# Patient Record
Sex: MALE | Race: WHITE | NOT HISPANIC OR LATINO
[De-identification: names, ages, dates, MRNs, and addresses within clinical notes are randomized per-mention and may not be internally consistent; named-entity substitution may affect disease eponyms.]

---

## 2017-11-09 PROBLEM — Z00.00 ENCOUNTER FOR PREVENTIVE HEALTH EXAMINATION: Status: ACTIVE | Noted: 2017-11-09

## 2017-11-15 ENCOUNTER — APPOINTMENT (OUTPATIENT)
Dept: HEMATOLOGY ONCOLOGY | Facility: CLINIC | Age: 57
End: 2017-11-15
Payer: COMMERCIAL

## 2017-11-15 VITALS
HEIGHT: 72 IN | BODY MASS INDEX: 35.89 KG/M2 | SYSTOLIC BLOOD PRESSURE: 128 MMHG | HEART RATE: 55 BPM | TEMPERATURE: 98.4 F | OXYGEN SATURATION: 100 % | WEIGHT: 265 LBS | DIASTOLIC BLOOD PRESSURE: 65 MMHG | RESPIRATION RATE: 16 BRPM

## 2017-11-15 PROCEDURE — 99245 OFF/OP CONSLTJ NEW/EST HI 55: CPT

## 2017-11-15 RX ORDER — MULTIVITAMIN
CAPSULE ORAL
Refills: 0 | Status: ACTIVE | COMMUNITY

## 2017-11-27 ENCOUNTER — APPOINTMENT (OUTPATIENT)
Dept: HEMATOLOGY ONCOLOGY | Facility: CLINIC | Age: 57
End: 2017-11-27
Payer: COMMERCIAL

## 2017-11-27 VITALS
BODY MASS INDEX: 36.57 KG/M2 | TEMPERATURE: 98.5 F | OXYGEN SATURATION: 96 % | HEART RATE: 60 BPM | HEIGHT: 71.97 IN | SYSTOLIC BLOOD PRESSURE: 136 MMHG | RESPIRATION RATE: 16 BRPM | WEIGHT: 270 LBS | DIASTOLIC BLOOD PRESSURE: 71 MMHG

## 2017-11-27 PROCEDURE — 99214 OFFICE O/P EST MOD 30 MIN: CPT

## 2018-02-09 ENCOUNTER — APPOINTMENT (OUTPATIENT)
Dept: HEMATOLOGY ONCOLOGY | Facility: CLINIC | Age: 58
End: 2018-02-09
Payer: COMMERCIAL

## 2018-02-09 VITALS
BODY MASS INDEX: 36.29 KG/M2 | DIASTOLIC BLOOD PRESSURE: 65 MMHG | RESPIRATION RATE: 20 BRPM | HEART RATE: 63 BPM | SYSTOLIC BLOOD PRESSURE: 122 MMHG | TEMPERATURE: 98.5 F | HEIGHT: 71.85 IN | OXYGEN SATURATION: 96 % | WEIGHT: 265 LBS

## 2018-02-09 PROCEDURE — 99214 OFFICE O/P EST MOD 30 MIN: CPT

## 2018-05-16 ENCOUNTER — APPOINTMENT (OUTPATIENT)
Dept: HEMATOLOGY ONCOLOGY | Facility: CLINIC | Age: 58
End: 2018-05-16

## 2018-12-19 ENCOUNTER — RECORD ABSTRACTING (OUTPATIENT)
Age: 58
End: 2018-12-19

## 2018-12-19 DIAGNOSIS — Z82.49 FAMILY HISTORY OF ISCHEMIC HEART DISEASE AND OTHER DISEASES OF THE CIRCULATORY SYSTEM: ICD-10-CM

## 2018-12-19 DIAGNOSIS — Z78.9 OTHER SPECIFIED HEALTH STATUS: ICD-10-CM

## 2019-01-18 ENCOUNTER — NON-APPOINTMENT (OUTPATIENT)
Age: 59
End: 2019-01-18

## 2019-01-18 ENCOUNTER — LABORATORY RESULT (OUTPATIENT)
Age: 59
End: 2019-01-18

## 2019-01-18 ENCOUNTER — APPOINTMENT (OUTPATIENT)
Dept: INTERNAL MEDICINE | Facility: CLINIC | Age: 59
End: 2019-01-18
Payer: COMMERCIAL

## 2019-01-18 VITALS
DIASTOLIC BLOOD PRESSURE: 82 MMHG | SYSTOLIC BLOOD PRESSURE: 128 MMHG | WEIGHT: 254 LBS | HEIGHT: 71 IN | BODY MASS INDEX: 35.56 KG/M2

## 2019-01-18 PROCEDURE — 93000 ELECTROCARDIOGRAM COMPLETE: CPT

## 2019-01-18 PROCEDURE — 99396 PREV VISIT EST AGE 40-64: CPT | Mod: 25

## 2019-01-18 PROCEDURE — 36415 COLL VENOUS BLD VENIPUNCTURE: CPT

## 2019-01-18 RX ORDER — IBUPROFEN 600 MG/1
600 TABLET, FILM COATED ORAL
Refills: 0 | Status: DISCONTINUED | COMMUNITY
End: 2019-01-18

## 2019-01-18 NOTE — HEALTH RISK ASSESSMENT
[Good] : ~his/her~  mood as  good [] : Yes [No falls in past year] : Patient reported no falls in the past year [0] : 2) Feeling down, depressed, or hopeless: Not at all (0) [HIV test declined] : HIV test declined [Hepatitis C test offered] : Hepatitis C test offered [None] : None [With Significant Other] : lives with significant other [] :  [Feels Safe at Home] : Feels safe at home [Fully functional (bathing, dressing, toileting, transferring, walking, feeding)] : Fully functional (bathing, dressing, toileting, transferring, walking, feeding) [Fully functional (using the telephone, shopping, preparing meals, housekeeping, doing laundry, using] : Fully functional and needs no help or supervision to perform IADLs (using the telephone, shopping, preparing meals, housekeeping, doing laundry, using transportation, managing medications and managing finances) [FreeTextEntry1] : upper right arm pain [de-identified] : 1 to 2 a day [de-identified] : social drinking [Reports changes in hearing] : Reports no changes in hearing [Reports changes in vision] : Reports no changes in vision [Reports changes in dental health] : Reports no changes in dental health [ColonoscopyComments] : scheduled at [facility], Feb [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

## 2019-01-18 NOTE — HISTORY OF PRESENT ILLNESS
[FreeTextEntry1] : c/o pain in upper right arm [de-identified] : Pt here for annual physical. Saw Dr Ha Sage for neck pain. He had an MRI neck and is going to do PT for 1 month. Then may be eval for injections if not improved. \par Otherwise feels well. No chest pains or SOB\par

## 2019-01-18 NOTE — REVIEW OF SYSTEMS
[Negative] : Heme/Lymph [Joint Pain] : joint pain [Joint Stiffness] : joint stiffness [Muscle Pain] : muscle pain [Joint Swelling] : no joint swelling [FreeTextEntry9] : + neck pain and stiffness

## 2019-01-19 LAB
ALBUMIN SERPL ELPH-MCNC: 4.8 G/DL
ALP BLD-CCNC: 37 U/L
ALT SERPL-CCNC: 31 U/L
ANION GAP SERPL CALC-SCNC: 11 MMOL/L
AST SERPL-CCNC: 22 U/L
BILIRUB SERPL-MCNC: 0.6 MG/DL
BUN SERPL-MCNC: 14 MG/DL
CALCIUM SERPL-MCNC: 9.3 MG/DL
CHLORIDE SERPL-SCNC: 104 MMOL/L
CHOLEST SERPL-MCNC: 199 MG/DL
CHOLEST/HDLC SERPL: 4.6 RATIO
CO2 SERPL-SCNC: 27 MMOL/L
CREAT SERPL-MCNC: 0.76 MG/DL
GLUCOSE SERPL-MCNC: 100 MG/DL
HBA1C MFR BLD HPLC: 5.2 %
HCV AB SER QL: NONREACTIVE
HCV S/CO RATIO: 0.13 S/CO
HDLC SERPL-MCNC: 43 MG/DL
LDLC SERPL CALC-MCNC: 122 MG/DL
POTASSIUM SERPL-SCNC: 4.9 MMOL/L
PROT SERPL-MCNC: 7.1 G/DL
PSA SERPL-MCNC: 2.84 NG/ML
SODIUM SERPL-SCNC: 142 MMOL/L
TRIGL SERPL-MCNC: 170 MG/DL
TSH SERPL-ACNC: 1.74 UIU/ML

## 2019-02-01 ENCOUNTER — TRANSCRIPTION ENCOUNTER (OUTPATIENT)
Age: 59
End: 2019-02-01

## 2022-08-01 ENCOUNTER — APPOINTMENT (OUTPATIENT)
Dept: FAMILY MEDICINE | Facility: CLINIC | Age: 62
End: 2022-08-01

## 2022-08-01 PROCEDURE — 99213 OFFICE O/P EST LOW 20 MIN: CPT | Mod: 95

## 2022-08-02 NOTE — HISTORY OF PRESENT ILLNESS
[FreeTextEntry8] : Pt is 61 y/o M that tested positive for Covid yesterday. Started sxs 2 days ago. Fever, cough and runny nose. \par Mucinex max helped. \par Vaccinated. \par needs a letter faxed to work. fax [phone]. attn: Mekhi.

## 2023-06-05 ENCOUNTER — TRANSCRIPTION ENCOUNTER (OUTPATIENT)
Age: 63
End: 2023-06-05

## 2023-06-29 ENCOUNTER — APPOINTMENT (OUTPATIENT)
Dept: INTERNAL MEDICINE | Facility: CLINIC | Age: 63
End: 2023-06-29
Payer: COMMERCIAL

## 2023-06-29 ENCOUNTER — NON-APPOINTMENT (OUTPATIENT)
Age: 63
End: 2023-06-29

## 2023-06-29 VITALS
BODY MASS INDEX: 34.3 KG/M2 | DIASTOLIC BLOOD PRESSURE: 80 MMHG | SYSTOLIC BLOOD PRESSURE: 140 MMHG | HEART RATE: 50 BPM | OXYGEN SATURATION: 98 % | HEIGHT: 71 IN | WEIGHT: 245 LBS

## 2023-06-29 VITALS — SYSTOLIC BLOOD PRESSURE: 120 MMHG | DIASTOLIC BLOOD PRESSURE: 72 MMHG

## 2023-06-29 DIAGNOSIS — F17.200 NICOTINE DEPENDENCE, UNSPECIFIED, UNCOMPLICATED: ICD-10-CM

## 2023-06-29 DIAGNOSIS — Z87.39 PERSONAL HISTORY OF OTHER DISEASES OF THE MUSCULOSKELETAL SYSTEM AND CONNECTIVE TISSUE: ICD-10-CM

## 2023-06-29 DIAGNOSIS — D72.819 DECREASED WHITE BLOOD CELL COUNT, UNSPECIFIED: ICD-10-CM

## 2023-06-29 DIAGNOSIS — D61.818 OTHER PANCYTOPENIA: ICD-10-CM

## 2023-06-29 DIAGNOSIS — U07.1 COVID-19: ICD-10-CM

## 2023-06-29 DIAGNOSIS — R00.1 BRADYCARDIA, UNSPECIFIED: ICD-10-CM

## 2023-06-29 DIAGNOSIS — Z12.5 ENCOUNTER FOR SCREENING FOR MALIGNANT NEOPLASM OF PROSTATE: ICD-10-CM

## 2023-06-29 DIAGNOSIS — K76.0 FATTY (CHANGE OF) LIVER, NOT ELSEWHERE CLASSIFIED: ICD-10-CM

## 2023-06-29 DIAGNOSIS — Z00.00 ENCOUNTER FOR GENERAL ADULT MEDICAL EXAMINATION W/OUT ABNORMAL FINDINGS: ICD-10-CM

## 2023-06-29 DIAGNOSIS — L98.9 DISORDER OF THE SKIN AND SUBCUTANEOUS TISSUE, UNSPECIFIED: ICD-10-CM

## 2023-06-29 DIAGNOSIS — Z86.2 PERSONAL HISTORY OF DISEASES OF THE BLOOD AND BLOOD-FORMING ORGANS AND CERTAIN DISORDERS INVOLVING THE IMMUNE MECHANISM: ICD-10-CM

## 2023-06-29 PROCEDURE — 99396 PREV VISIT EST AGE 40-64: CPT | Mod: 25

## 2023-06-29 PROCEDURE — 93000 ELECTROCARDIOGRAM COMPLETE: CPT

## 2023-06-29 PROCEDURE — 82270 OCCULT BLOOD FECES: CPT

## 2023-06-29 PROCEDURE — 36415 COLL VENOUS BLD VENIPUNCTURE: CPT

## 2023-06-29 RX ORDER — NIRMATRELVIR AND RITONAVIR 300-100 MG
20 X 150 MG & KIT ORAL
Qty: 1 | Refills: 0 | Status: DISCONTINUED | COMMUNITY
Start: 2022-08-01 | End: 2023-06-29

## 2023-06-29 NOTE — PHYSICAL EXAM
[Normal] : no acute distress, well nourished, well developed and well-appearing [No Acute Distress] : no acute distress [Well Nourished] : well nourished [Well Developed] : well developed [Well-Appearing] : well-appearing [Normal Sclera/Conjunctiva] : normal sclera/conjunctiva [EOMI] : extraocular movements intact [Normal Outer Ear/Nose] : the outer ears and nose were normal in appearance [Normal Oropharynx] : the oropharynx was normal [Normal TMs] : both tympanic membranes were normal [No JVD] : no jugular venous distention [No Lymphadenopathy] : no lymphadenopathy [Supple] : supple [Thyroid Normal, No Nodules] : the thyroid was normal and there were no nodules present [No Respiratory Distress] : no respiratory distress  [No Accessory Muscle Use] : no accessory muscle use [Clear to Auscultation] : lungs were clear to auscultation bilaterally [Normal Rate] : normal rate  [Regular Rhythm] : with a regular rhythm [Normal S1, S2] : normal S1 and S2 [No Murmur] : no murmur heard [No Carotid Bruits] : no carotid bruits [Pedal Pulses Present] : the pedal pulses are present [No Edema] : there was no peripheral edema [Soft] : abdomen soft [Non Tender] : non-tender [Non-distended] : non-distended [No Masses] : no abdominal mass palpated [No HSM] : no HSM [Normal Bowel Sounds] : normal bowel sounds [Normal Sphincter Tone] : normal sphincter tone [No Mass] : no mass [Normal Posterior Cervical Nodes] : no posterior cervical lymphadenopathy [Normal Anterior Cervical Nodes] : no anterior cervical lymphadenopathy [No CVA Tenderness] : no CVA  tenderness [No Spinal Tenderness] : no spinal tenderness [No Joint Swelling] : no joint swelling [Grossly Normal Strength/Tone] : grossly normal strength/tone [No Rash] : no rash [Coordination Grossly Intact] : coordination grossly intact [No Focal Deficits] : no focal deficits [Normal Gait] : normal gait [Deep Tendon Reflexes (DTR)] : deep tendon reflexes were 2+ and symmetric [Normal Affect] : the affect was normal [Normal Insight/Judgement] : insight and judgment were intact [Stool Occult Blood] : stool negative for occult blood [FreeTextEntry1] : stool for OB neg

## 2023-06-29 NOTE — HEALTH RISK ASSESSMENT
[Good] : ~his/her~  mood as  good [Yes] : Yes [2 - 4 times a month (2 pts)] : 2-4 times a month (2 points) [1 or 2 (0 pts)] : 1 or 2 (0 points) [Never (0 pts)] : Never (0 points) [No] : In the past 12 months have you used drugs other than those required for medical reasons? No [No falls in past year] : Patient reported no falls in the past year [0] : 2) Feeling down, depressed, or hopeless: Not at all (0) [Hepatitis C test offered] : Hepatitis C test offered [Current] : Current [Patient reported colonoscopy was normal] : Patient reported colonoscopy was normal [HIV test declined] : HIV test declined [None] : None [With Significant Other] : lives with significant other [Employed] : employed [] :  [Feels Safe at Home] : Feels safe at home [Fully functional (bathing, dressing, toileting, transferring, walking, feeding)] : Fully functional (bathing, dressing, toileting, transferring, walking, feeding) [Fully functional (using the telephone, shopping, preparing meals, housekeeping, doing laundry, using] : Fully functional and needs no help or supervision to perform IADLs (using the telephone, shopping, preparing meals, housekeeping, doing laundry, using transportation, managing medications and managing finances) [0-4] : 0-4 [FreeTextEntry1] : none [de-identified] : walking,hiking  [de-identified] : regular diet  [[id number]] : 0 [Reports changes in hearing] : Reports no changes in hearing [Reports changes in vision] : Reports no changes in vision [Reports changes in dental health] : Reports no changes in dental health [ColonoscopyDate] : 01/19 [ColonoscopyComments] : Dr Scanlon in [facility], due 9/1/23 [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

## 2023-06-29 NOTE — HISTORY OF PRESENT ILLNESS
[FreeTextEntry1] : Physical  [de-identified] : Pt here for annual physical.  Has not had a physical since 2019.  Lost 9 lb since jan 2019. He exercises a couple times per week. No cardiac complaints. \par Pt still smoking about 6x per week.  He understands the neg risks\par

## 2023-06-30 ENCOUNTER — TRANSCRIPTION ENCOUNTER (OUTPATIENT)
Age: 63
End: 2023-06-30

## 2023-06-30 LAB
ALBUMIN SERPL ELPH-MCNC: 5.1 G/DL
ALP BLD-CCNC: 41 U/L
ALT SERPL-CCNC: 26 U/L
ANION GAP SERPL CALC-SCNC: 12 MMOL/L
APPEARANCE: CLEAR
AST SERPL-CCNC: 22 U/L
BILIRUB SERPL-MCNC: 0.6 MG/DL
BILIRUBIN URINE: NEGATIVE
BLOOD URINE: NEGATIVE
BUN SERPL-MCNC: 10 MG/DL
CALCIUM SERPL-MCNC: 9.7 MG/DL
CHLORIDE SERPL-SCNC: 104 MMOL/L
CHOLEST SERPL-MCNC: 208 MG/DL
CO2 SERPL-SCNC: 28 MMOL/L
COLOR: YELLOW
CREAT SERPL-MCNC: 0.82 MG/DL
EGFR: 100 ML/MIN/1.73M2
ESTIMATED AVERAGE GLUCOSE: 100 MG/DL
GLUCOSE QUALITATIVE U: NEGATIVE MG/DL
GLUCOSE SERPL-MCNC: 94 MG/DL
HBA1C MFR BLD HPLC: 5.1 %
HCT VFR BLD CALC: 45.2 %
HCV AB SER QL: NONREACTIVE
HCV S/CO RATIO: 0.1 S/CO
HDLC SERPL-MCNC: 50 MG/DL
HGB BLD-MCNC: 14.3 G/DL
HIV1+2 AB SPEC QL IA.RAPID: NONREACTIVE
KETONES URINE: NEGATIVE MG/DL
LDLC SERPL CALC-MCNC: 134 MG/DL
LEUKOCYTE ESTERASE URINE: NEGATIVE
MCHC RBC-ENTMCNC: 28 PG
MCHC RBC-ENTMCNC: 31.6 GM/DL
MCV RBC AUTO: 88.5 FL
NITRITE URINE: NEGATIVE
NONHDLC SERPL-MCNC: 158 MG/DL
PH URINE: 6.5
PLATELET # BLD AUTO: 132 K/UL
POTASSIUM SERPL-SCNC: 4.7 MMOL/L
PROT SERPL-MCNC: 7.3 G/DL
PROTEIN URINE: NEGATIVE MG/DL
PSA SERPL-MCNC: 3.23 NG/ML
RBC # BLD: 5.11 M/UL
RBC # FLD: 13.7 %
SODIUM SERPL-SCNC: 144 MMOL/L
SPECIFIC GRAVITY URINE: 1.01
TRIGL SERPL-MCNC: 121 MG/DL
TSH SERPL-ACNC: 2 UIU/ML
UROBILINOGEN URINE: 0.2 MG/DL
WBC # FLD AUTO: 4.66 K/UL

## 2023-07-01 ENCOUNTER — TRANSCRIPTION ENCOUNTER (OUTPATIENT)
Age: 63
End: 2023-07-01

## 2023-07-01 LAB
VZV AB TITR SER: POSITIVE
VZV IGG SER IF-ACNC: 844.2 INDEX

## 2023-07-03 ENCOUNTER — TRANSCRIPTION ENCOUNTER (OUTPATIENT)
Age: 63
End: 2023-07-03

## 2023-07-10 ENCOUNTER — APPOINTMENT (OUTPATIENT)
Dept: THORACIC SURGERY | Facility: CLINIC | Age: 63
End: 2023-07-10
Payer: COMMERCIAL

## 2023-07-10 VITALS — BODY MASS INDEX: 34.3 KG/M2 | HEIGHT: 71 IN | WEIGHT: 245 LBS

## 2023-07-10 DIAGNOSIS — F17.210 NICOTINE DEPENDENCE, CIGARETTES, UNCOMPLICATED: ICD-10-CM

## 2023-07-10 PROCEDURE — G0296 VISIT TO DETERM LDCT ELIG: CPT

## 2023-07-10 NOTE — ASSESSMENT
[Ready] : Patient is ready for cessation intervention [Contemplation] : Contemplation: The patient is considering quitting smoking [de-identified] : Acknowledged how  difficult it is to quit smoking. Advised that quitting smoking is the most important thing a person can do for their health.  Discussed use of gum or lozenge in an effort to quit. He will consider.

## 2023-07-10 NOTE — PLAN
[Smoking Cessation Guidance Provided] : Smoking cessation guidance was provided to patient [Smoking Cessation] : smoking cessation [FreeTextEntry1] : Plan:\par \par -Low dose CT chest for lung cancer screening. RETESHALA SHANIQUA ordered the low dose CT.      \par \par -Follow up with  his referring provider after his LDCT results have been reviewed by the multidisciplinary clinical team, if needed.\par \par -Encouraged smoking cessation.\par \par -Patient declines referral to CTC and CCX\par \par Should I screen? tool utilized. 6 Year risk of lung cancer is   0.2%. \par \par Patient wishes to proceed with screening.\par \par Engaged in discussion regarding risks of screening during Covid-19 pandemic and precautions that are being used  to reduce exposure.\par \par Engaged in shared decision making with Mr. VARNER . Answered all questions. He verbalized understanding and agreement. He knows to call back with and questions or concerns.\par

## 2023-07-10 NOTE — HISTORY OF PRESENT ILLNESS
[Current] : Current [TextBox_13] : Referred by Dr. Sepulveda\par \par STEFFI VARNER had telephonic visit for a review of eligibility and discussion of the Low dose CT lung cancer screening program. The following was reviewed and confirmed the patient meets screening eligibility criteria.\par -Age 63 year\par Smoking Status:\par -Current smoker\par Started smoking at 20   years old. Smoked 10 cigarettes per day for 26, then none and up to 5 cigarettes per day for 15 years years.Llast smoked. 2023\par -Number of years smoked: 41\par -Number of pack years smokin\par \par Mr. VARNER denies any signs or symptoms of lung cancer including new cough, changing cough, hemoptysis, and unintentional weight loss. \par \par Mr. VARNER Covid infection 2023, 2022 - mild symptoms, fatty liver, thrombocytopenia. He denies COPD, heart disease, connective tissue disease, and any personal history of cancer. Reports no lung cancer in a 1st degree relative. Reports no lung cancer in a 2nd degree relative. Denies any  history of  occupational exposures. Had exposure to 2nd hand smoke while growing up.\par  [PacksperYear] : 20

## 2023-07-17 ENCOUNTER — RESULT REVIEW (OUTPATIENT)
Age: 63
End: 2023-07-17

## 2023-07-19 ENCOUNTER — APPOINTMENT (OUTPATIENT)
Dept: DERMATOLOGY | Facility: CLINIC | Age: 63
End: 2023-07-19
Payer: COMMERCIAL

## 2023-07-19 ENCOUNTER — TRANSCRIPTION ENCOUNTER (OUTPATIENT)
Age: 63
End: 2023-07-19

## 2023-07-19 ENCOUNTER — NON-APPOINTMENT (OUTPATIENT)
Age: 63
End: 2023-07-19

## 2023-07-19 VITALS — WEIGHT: 245 LBS | BODY MASS INDEX: 34.3 KG/M2 | HEIGHT: 71 IN

## 2023-07-19 DIAGNOSIS — L72.11 PILAR CYST: ICD-10-CM

## 2023-07-19 DIAGNOSIS — L82.0 INFLAMED SEBORRHEIC KERATOSIS: ICD-10-CM

## 2023-07-19 DIAGNOSIS — L57.0 ACTINIC KERATOSIS: ICD-10-CM

## 2023-07-19 PROCEDURE — 17110 DESTRUCTION B9 LES UP TO 14: CPT

## 2023-07-19 PROCEDURE — 99203 OFFICE O/P NEW LOW 30 MIN: CPT | Mod: 25

## 2023-07-19 NOTE — PHYSICAL EXAM
[Alert] : alert [Oriented x 3] : ~L oriented x 3 [FreeTextEntry3] : Focused exam performed. Findings notable for:\par \par Approx 1 cm mobile subq nodule on L forehead\par Stuck on tan papule on L nasal dorsum\par Diffuse rough gritty erythema on nose

## 2023-07-19 NOTE — HISTORY OF PRESENT ILLNESS
[FreeTextEntry1] : Skin lesions on face - NPA [de-identified] : # Concerning skin lesion \par Location: nose and forehead \par Duration: about 6 months for nose, and years for forehead. \par Symptoms: none\par Personal history of skin cancer: none\par Family history of skin cancer: none\par \par No ICD/PPM\par No artificial heart valves\par No artificial joints\par No hx of endocarditis\par No anticoagulation/ASA use\par \par

## 2023-07-19 NOTE — ASSESSMENT
[FreeTextEntry1] : # Inflamed SK - L nasal dorsum\par - No concerning clinical or dermoscopic features\par - Recommend LN2, if not resolved can consider biopsy\par - The risks/benefits/alternatives of cryo-destruction was explained to the patient . The patient expressed understanding of these risks and agreed to the procedure. # 1 lesions treated with 2 cycle of LN2. The procedure was well tolerated, without complication. We have discussed related skin care.\par \par # Diffuse actinic damage, nose\par - We have discussed the nature and usual course of these lesions including small risk of transformation to SCC\par - Reviewed tx options including observation, spot LN2, and field therapy with topical chemotherapy cream\par - Elects for observation for now; may consider field therapy in future\par \par # NUB - L forehead, favor pilar cyst\par - Desires excision\par - R/B/E reviewed including risk of bleeding, dehiscence, and scarring\par - To be scheduling pending insurance auth: Dx D 48.5, CPT 21888 and [id number]\par \par

## 2023-07-23 ENCOUNTER — NON-APPOINTMENT (OUTPATIENT)
Age: 63
End: 2023-07-23

## 2024-07-12 ENCOUNTER — NON-APPOINTMENT (OUTPATIENT)
Age: 64
End: 2024-07-12

## 2024-07-12 ENCOUNTER — APPOINTMENT (OUTPATIENT)
Dept: INTERNAL MEDICINE | Facility: CLINIC | Age: 64
End: 2024-07-12
Payer: COMMERCIAL

## 2024-07-12 VITALS
SYSTOLIC BLOOD PRESSURE: 118 MMHG | BODY MASS INDEX: 34.44 KG/M2 | WEIGHT: 246 LBS | OXYGEN SATURATION: 98 % | DIASTOLIC BLOOD PRESSURE: 74 MMHG | HEART RATE: 58 BPM | HEIGHT: 71 IN

## 2024-07-12 DIAGNOSIS — L98.9 DISORDER OF THE SKIN AND SUBCUTANEOUS TISSUE, UNSPECIFIED: ICD-10-CM

## 2024-07-12 DIAGNOSIS — F17.200 NICOTINE DEPENDENCE, UNSPECIFIED, UNCOMPLICATED: ICD-10-CM

## 2024-07-12 DIAGNOSIS — Z12.5 ENCOUNTER FOR SCREENING FOR MALIGNANT NEOPLASM OF PROSTATE: ICD-10-CM

## 2024-07-12 DIAGNOSIS — Z00.00 ENCOUNTER FOR GENERAL ADULT MEDICAL EXAMINATION W/OUT ABNORMAL FINDINGS: ICD-10-CM

## 2024-07-12 DIAGNOSIS — R00.1 BRADYCARDIA, UNSPECIFIED: ICD-10-CM

## 2024-07-12 DIAGNOSIS — K76.0 FATTY (CHANGE OF) LIVER, NOT ELSEWHERE CLASSIFIED: ICD-10-CM

## 2024-07-12 PROCEDURE — 36415 COLL VENOUS BLD VENIPUNCTURE: CPT

## 2024-07-12 PROCEDURE — 99396 PREV VISIT EST AGE 40-64: CPT

## 2024-07-12 PROCEDURE — 93000 ELECTROCARDIOGRAM COMPLETE: CPT

## 2024-07-15 ENCOUNTER — TRANSCRIPTION ENCOUNTER (OUTPATIENT)
Age: 64
End: 2024-07-15

## 2024-07-15 LAB
ALBUMIN SERPL ELPH-MCNC: 5 G/DL
ALP BLD-CCNC: 45 U/L
ALT SERPL-CCNC: 23 U/L
ANION GAP SERPL CALC-SCNC: 12 MMOL/L
AST SERPL-CCNC: 21 U/L
BILIRUB SERPL-MCNC: 0.6 MG/DL
BUN SERPL-MCNC: 13 MG/DL
CALCIUM SERPL-MCNC: 9.7 MG/DL
CHLORIDE SERPL-SCNC: 102 MMOL/L
CHOLEST SERPL-MCNC: 192 MG/DL
CO2 SERPL-SCNC: 24 MMOL/L
CREAT SERPL-MCNC: 0.82 MG/DL
EGFR: 99 ML/MIN/1.73M2
ESTIMATED AVERAGE GLUCOSE: 97 MG/DL
GLUCOSE SERPL-MCNC: 96 MG/DL
HBA1C MFR BLD HPLC: 5 %
HCT VFR BLD CALC: 45.5 %
HDLC SERPL-MCNC: 44 MG/DL
HGB BLD-MCNC: 14.2 G/DL
LDLC SERPL CALC-MCNC: 126 MG/DL
MCV RBC AUTO: 88.9 FL
NONHDLC SERPL-MCNC: 148 MG/DL
PLATELET # BLD AUTO: 112 K/UL
POTASSIUM SERPL-SCNC: 4.4 MMOL/L
PROT SERPL-MCNC: 7.2 G/DL
PSA SERPL-MCNC: 3.56 NG/ML
RBC # BLD: 5.12 M/UL
RBC # FLD: 14.6 %
SODIUM SERPL-SCNC: 138 MMOL/L
TRIGL SERPL-MCNC: 124 MG/DL
TSH SERPL-ACNC: 1.97 UIU/ML
WBC # FLD AUTO: 3.62 K/UL

## 2024-07-17 ENCOUNTER — TRANSCRIPTION ENCOUNTER (OUTPATIENT)
Age: 64
End: 2024-07-17

## 2024-08-16 ENCOUNTER — NON-APPOINTMENT (OUTPATIENT)
Age: 64
End: 2024-08-16

## 2024-08-20 ENCOUNTER — NON-APPOINTMENT (OUTPATIENT)
Age: 64
End: 2024-08-20

## 2024-09-30 ENCOUNTER — NON-APPOINTMENT (OUTPATIENT)
Age: 64
End: 2024-09-30

## 2024-11-26 ENCOUNTER — APPOINTMENT (OUTPATIENT)
Dept: THORACIC SURGERY | Facility: CLINIC | Age: 64
End: 2024-11-26

## 2024-11-26 ENCOUNTER — NON-APPOINTMENT (OUTPATIENT)
Age: 64
End: 2024-11-26

## 2024-12-03 ENCOUNTER — RESULT REVIEW (OUTPATIENT)
Age: 64
End: 2024-12-03

## 2024-12-10 ENCOUNTER — TRANSCRIPTION ENCOUNTER (OUTPATIENT)
Age: 64
End: 2024-12-10

## 2024-12-11 ENCOUNTER — TRANSCRIPTION ENCOUNTER (OUTPATIENT)
Age: 64
End: 2024-12-11

## 2025-07-23 ENCOUNTER — NON-APPOINTMENT (OUTPATIENT)
Age: 65
End: 2025-07-23

## 2025-07-25 ENCOUNTER — NON-APPOINTMENT (OUTPATIENT)
Age: 65
End: 2025-07-25

## 2025-07-25 ENCOUNTER — APPOINTMENT (OUTPATIENT)
Dept: INTERNAL MEDICINE | Facility: CLINIC | Age: 65
End: 2025-07-25
Payer: COMMERCIAL

## 2025-07-25 VITALS
DIASTOLIC BLOOD PRESSURE: 70 MMHG | HEART RATE: 66 BPM | BODY MASS INDEX: 35 KG/M2 | OXYGEN SATURATION: 98 % | SYSTOLIC BLOOD PRESSURE: 126 MMHG | TEMPERATURE: 97.6 F | RESPIRATION RATE: 16 BRPM | WEIGHT: 250 LBS | HEIGHT: 71 IN

## 2025-07-25 DIAGNOSIS — L82.0 INFLAMED SEBORRHEIC KERATOSIS: ICD-10-CM

## 2025-07-25 DIAGNOSIS — L72.11 PILAR CYST: ICD-10-CM

## 2025-07-25 DIAGNOSIS — Z12.5 ENCOUNTER FOR SCREENING FOR MALIGNANT NEOPLASM OF PROSTATE: ICD-10-CM

## 2025-07-25 DIAGNOSIS — L98.9 DISORDER OF THE SKIN AND SUBCUTANEOUS TISSUE, UNSPECIFIED: ICD-10-CM

## 2025-07-25 DIAGNOSIS — D72.819 DECREASED WHITE BLOOD CELL COUNT, UNSPECIFIED: ICD-10-CM

## 2025-07-25 DIAGNOSIS — K76.0 FATTY (CHANGE OF) LIVER, NOT ELSEWHERE CLASSIFIED: ICD-10-CM

## 2025-07-25 DIAGNOSIS — Z87.2 PERSONAL HISTORY OF DISEASES OF THE SKIN AND SUBCUTANEOUS TISSUE: ICD-10-CM

## 2025-07-25 DIAGNOSIS — Z00.00 ENCOUNTER FOR GENERAL ADULT MEDICAL EXAMINATION W/OUT ABNORMAL FINDINGS: ICD-10-CM

## 2025-07-25 DIAGNOSIS — D69.6 THROMBOCYTOPENIA, UNSPECIFIED: ICD-10-CM

## 2025-07-25 DIAGNOSIS — R00.1 BRADYCARDIA, UNSPECIFIED: ICD-10-CM

## 2025-07-25 DIAGNOSIS — Z86.2 PERSONAL HISTORY OF DISEASES OF THE BLOOD AND BLOOD-FORMING ORGANS AND CERTAIN DISORDERS INVOLVING THE IMMUNE MECHANISM: ICD-10-CM

## 2025-07-25 DIAGNOSIS — F17.210 NICOTINE DEPENDENCE, CIGARETTES, UNCOMPLICATED: ICD-10-CM

## 2025-07-25 DIAGNOSIS — E78.5 HYPERLIPIDEMIA, UNSPECIFIED: ICD-10-CM

## 2025-07-25 DIAGNOSIS — N52.9 MALE ERECTILE DYSFUNCTION, UNSPECIFIED: ICD-10-CM

## 2025-07-25 PROCEDURE — 36415 COLL VENOUS BLD VENIPUNCTURE: CPT

## 2025-07-25 PROCEDURE — 99396 PREV VISIT EST AGE 40-64: CPT

## 2025-07-25 PROCEDURE — 93000 ELECTROCARDIOGRAM COMPLETE: CPT

## 2025-07-25 RX ORDER — SILDENAFIL 50 MG/1
50 TABLET ORAL
Qty: 2 | Refills: 1 | Status: ACTIVE | COMMUNITY
Start: 2025-07-25 | End: 1900-01-01

## 2025-07-28 LAB
ALBUMIN SERPL ELPH-MCNC: 5 G/DL
ALP BLD-CCNC: 43 U/L
ALT SERPL-CCNC: 34 U/L
ANION GAP SERPL CALC-SCNC: 14 MMOL/L
AST SERPL-CCNC: 30 U/L
BASOPHILS # BLD AUTO: 0.02 K/UL
BASOPHILS NFR BLD AUTO: 0.5 %
BILIRUB SERPL-MCNC: 0.5 MG/DL
BUN SERPL-MCNC: 11 MG/DL
CALCIUM SERPL-MCNC: 9.7 MG/DL
CHLORIDE SERPL-SCNC: 104 MMOL/L
CHOLEST SERPL-MCNC: 213 MG/DL
CO2 SERPL-SCNC: 22 MMOL/L
CREAT SERPL-MCNC: 0.76 MG/DL
EGFRCR SERPLBLD CKD-EPI 2021: 101 ML/MIN/1.73M2
EOSINOPHIL # BLD AUTO: 0.05 K/UL
EOSINOPHIL NFR BLD AUTO: 1.2 %
ESTIMATED AVERAGE GLUCOSE: 100 MG/DL
GLUCOSE SERPL-MCNC: 95 MG/DL
HBA1C MFR BLD HPLC: 5.1 %
HCT VFR BLD CALC: 45.8 %
HDLC SERPL-MCNC: 49 MG/DL
HGB BLD-MCNC: 14.8 G/DL
IMM GRANULOCYTES NFR BLD AUTO: 0.5 %
LDLC SERPL-MCNC: 145 MG/DL
LYMPHOCYTES # BLD AUTO: 1.41 K/UL
LYMPHOCYTES NFR BLD AUTO: 33.5 %
MAN DIFF?: NORMAL
MCHC RBC-ENTMCNC: 27.7 PG
MCHC RBC-ENTMCNC: 32.3 G/DL
MCV RBC AUTO: 85.6 FL
MONOCYTES # BLD AUTO: 0.36 K/UL
MONOCYTES NFR BLD AUTO: 8.6 %
NEUTROPHILS # BLD AUTO: 2.35 K/UL
NEUTROPHILS NFR BLD AUTO: 55.7 %
NONHDLC SERPL-MCNC: 164 MG/DL
PLATELET # BLD AUTO: 117 K/UL
POTASSIUM SERPL-SCNC: 4.3 MMOL/L
PROT SERPL-MCNC: 7.4 G/DL
PSA SERPL-MCNC: 3.39 NG/ML
RBC # BLD: 5.35 M/UL
RBC # FLD: 14.3 %
SODIUM SERPL-SCNC: 140 MMOL/L
TRIGL SERPL-MCNC: 105 MG/DL
WBC # FLD AUTO: 4.21 K/UL

## 2025-08-06 ENCOUNTER — TRANSCRIPTION ENCOUNTER (OUTPATIENT)
Age: 65
End: 2025-08-06